# Patient Record
Sex: MALE | Race: WHITE | URBAN - METROPOLITAN AREA
[De-identification: names, ages, dates, MRNs, and addresses within clinical notes are randomized per-mention and may not be internally consistent; named-entity substitution may affect disease eponyms.]

---

## 2017-02-04 ENCOUNTER — APPOINTMENT (OUTPATIENT)
Dept: GENERAL RADIOLOGY | Facility: CLINIC | Age: 22
End: 2017-02-04
Attending: EMERGENCY MEDICINE
Payer: COMMERCIAL

## 2017-02-04 ENCOUNTER — HOSPITAL ENCOUNTER (EMERGENCY)
Facility: CLINIC | Age: 22
Discharge: HOME | End: 2017-02-04
Attending: EMERGENCY MEDICINE | Admitting: EMERGENCY MEDICINE
Payer: COMMERCIAL

## 2017-02-04 VITALS
SYSTOLIC BLOOD PRESSURE: 120 MMHG | HEIGHT: 70 IN | TEMPERATURE: 97.7 F | OXYGEN SATURATION: 99 % | RESPIRATION RATE: 18 BRPM | DIASTOLIC BLOOD PRESSURE: 80 MMHG | HEART RATE: 66 BPM

## 2017-02-04 DIAGNOSIS — S39.92XA TAILBONE INJURY, INITIAL ENCOUNTER: ICD-10-CM

## 2017-02-04 PROCEDURE — 99283 EMERGENCY DEPT VISIT LOW MDM: CPT | Mod: Z6 | Performed by: EMERGENCY MEDICINE

## 2017-02-04 PROCEDURE — 72220 X-RAY EXAM SACRUM TAILBONE: CPT

## 2017-02-04 PROCEDURE — 99283 EMERGENCY DEPT VISIT LOW MDM: CPT

## 2017-02-04 ASSESSMENT — ENCOUNTER SYMPTOMS
NUMBNESS: 0
WEAKNESS: 0
FEVER: 0
BACK PAIN: 1
WOUND: 1

## 2017-02-04 NOTE — ED AVS SNAPSHOT
[facility], [place], Emergency Department    [address] [facility] [address]    Phone:  [phone]                                       Jose Davey   MRN: [id number]    Department:  [facility], Emergency Department   Date of Visit:  2/4/2017           After Visit Summary Signature Page     I have received my discharge instructions, and my questions have been answered. I have discussed any challenges I see with this plan with the nurse or doctor.    ..........................................................................................................................................  Patient/Patient Representative Signature      ..........................................................................................................................................  Patient Representative Print Name and Relationship to Patient    ..................................................               ................................................  Date                                            Time    ..........................................................................................................................................  Reviewed by Signature/Title    ...................................................              ..............................................  Date                                                            Time

## 2017-02-04 NOTE — ED PROVIDER NOTES
"  History     Chief Complaint   Patient presents with     Tailbone Pain     HPI  Jose Davey is a 21 year old male who presents to the Emergency Department for evaluation of back pain. Yesterday around 2:00 PM (24 hours ago), the patient was playing hockey when he fell onto his side and slid into the wall with his knees bent behind him, causing his skate to hit his in the tailbone region. This was with the skate guard and not the blade of the skate.  He developed pain immediately afterwards, but was minimal and he was able to continue to play well into the night. However, when he woke this morning his pain was significantly worse, prompting him to come to the ED. He has not taken anything for his discomfort. He denies numbness, tingling, loss of bowel or bladder control or any other concerns or complaints at this time.  No history of back or tailbone injuries. No back surgeries. Not anticoagulated on aspirin or otherwise.    History reviewed. No pertinent past medical history.    History reviewed. No pertinent past surgical history.    No family history on file.    Social History   Substance Use Topics     Smoking status: Never Smoker      Smokeless tobacco: Not on file     Alcohol Use: Not on file       No current facility-administered medications for this encounter.     No current outpatient prescriptions on file.      No Known Allergies    I have reviewed the Medications, Allergies, Past Medical and Surgical History, and Social History in the Epic system.    Review of Systems   Constitutional: Negative for fever.   Musculoskeletal: Positive for back pain.   Skin: Positive for wound.   Neurological: Negative for weakness and numbness.   All other systems reviewed and are negative.        Physical Exam   BP: 138/57 mmHg  Heart Rate: 57  Temp: 97.7  F (36.5  C)  Resp: 18  Height: 177.8 cm (5' 10\")  SpO2: 100 %  Physical Exam   General: patient is alert and oriented and in no acute distress   Head: atraumatic and " normocephalic   EENT: moist mucus membranes, pupils round and reactive   Neck: supple   Cardiovascular: regular rate and rhythm, extremities warm and well perfused, no lower extremity edema, 2+ PT pulses  Pulmonary: lungs clear to auscultation bilaterally   Abdomen: soft, non-tender   Musculoskeletal: normal range of motion of the lower extremities  Neurological: alert and oriented, moving all extremities symmetrically, gait normal, no midline tenderness to palpation of the thoracic or lumbar spine however does have tenderness to palpation at the coccyx, strength is 5 out of 5 and symmetric with hip flexion, hip extension, knee flexion, knee extension and ankle plantar dorsiflexion, sensation to light touch in the lower extremities is intact bilaterally, no clonus  Skin: warm, dry, small abrasion on the right buttock consistent with skate guard      ED Course     1:56 PM  The patient was seen and examined by Dr. Powell in Room 11.     Procedures             Critical Care time:  none               Labs Ordered and Resulted from Time of ED Arrival Up to the Time of Departure from the ED - No data to display    Assessments & Plan (with Medical Decision Making)   Mr. Davey is a 21 year old male who presents to the Emergency Department for evaluation of back pain.  He is neurologically intact.  Plain films were obtained which show no evidence of fracture.  He will was instructed to avoid any further hockey or snowboarding for the next week to rule out evidentiary to heal.  He is instructed to use Tylenol and ibuprofen regularly for pain control and voiced understanding.    I have reviewed the nursing notes.    I have reviewed the findings, diagnosis, plan and need for follow up with the patient.    There are no discharge medications for this patient.      Final diagnoses:   Tailbone injury, initial encounter     Alberta VIZCAINO, barbara serving as a trained medical scribe to document services personally performed by Enedina  MD Andre, based on the provider's statements to me.      I, Enedina Powell MD, was physically present and have reviewed and verified the accuracy of this note documented by Alberta Villalta.     2/4/2017   [facility], [place], EMERGENCY DEPARTMENT      Enedina Powell MD  02/04/17 [id number]

## 2017-02-04 NOTE — DISCHARGE INSTRUCTIONS
Please make an appointment to follow up with Your Primary Care Provider in 7-10 days if not improving.  If you have any worsening symptoms including numbness/tingling/weakness in you legs, incontinence or worsening pain, return to the emergency department for re-evaluation.  Avoid snowboarding or hockey for the next week.  Use acetaminophen or tylenol regularly for pain control.    Coccyx or Sacrum Contusion    You have a contusion (bruise) of the coccyx or sacrum. The sacrum is the triangular bone at the base of the spine that joins the pelvic bones. The coccyx (tailbone) is the last bone of the sacrum that hangs down in a point like a small tail. Symptoms include swelling and some bleeding under the skin. This injury generaly takes a few weeks to heal. During that time, the bruise will typically change in color from reddish, to purple-blue, to greenish-yellow, then to yellow-brown. A crack (fracture) in the coccyx bone causes the same symptoms as a contusion in this area. Often, x-rays are not taken since the treatment is the same. If you have fracture of the tailbone as well as a contusion, healing generally takes about four weeks or longer.  Home care    Try to find a position of comfort. Try lying on your side with your knees bent up towards your chest and a pillow between your knees.    A bruised tailbone causes pain when sitting. You may try using a donut pillow. This is a foam pillow with a hole in the center to prevent pressure on the tailbone. You can buy this at a pharmacy or orthopedic supply store.    Ice the injured area to help reduce pain and swelling. Wrap a cold source (ice pack or ice cubes in a plastic bag) in a thin towel. Apply to the bruised area for 20 minutes every 1 to 2 hours the first day. Continue this 3 to 4 times a day until the pain and swelling goes away.    Unless another medication was prescribed, you can take acetaminophen, ibuprofen, or naproxen to control pain. (If you have  chronic liver or kidney disease or ever had a stomach ulcer or GI bleeding, talk with your doctor before using these medicines.)  Follow up  Follow up with your health care provider or our staff as advised. Call if you are not improving within 2 weeks.  When to seek medical advice   Call your health care provider right away if you have any of the following:    Increased pain or swelling    Pain that becomes worse or spreads to one or both legs    Weakness or numbness in one or both legs    Loss of bowel or bladder control    Numbness in the groin area    Signs of infection, including warmth, drainage, or increased redness    Frequent bruising for unknown reasons    0111-8723 [employer]. [address] [place] [address], [place] [address]. All rights reserved. This information is not intended as a substitute for professional medical care. Always follow your healthcare professional's instructions.

## 2017-02-04 NOTE — LETTER
[facility], [place], EMERGENCY DEPARTMENT  [address] [facility] [address]  [phone]    2017    Jose Davey  [address] [facility] [address]  [phone] (home)     : 1995      To Whom it may concern:    Jose Davey was seen in our Emergency Department today, 2017.  Please excuse him from any sports including snowboarding and hockey for the next week.     Sincerely,        Enedina Powell
